# Patient Record
Sex: FEMALE | Race: AMERICAN INDIAN OR ALASKA NATIVE
[De-identification: names, ages, dates, MRNs, and addresses within clinical notes are randomized per-mention and may not be internally consistent; named-entity substitution may affect disease eponyms.]

---

## 2017-01-11 ENCOUNTER — HOSPITAL ENCOUNTER (OUTPATIENT)
Dept: HOSPITAL 5 - CT | Age: 32
Discharge: HOME | End: 2017-01-11
Attending: INTERNAL MEDICINE
Payer: MEDICAID
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
## 2017-01-11 DIAGNOSIS — D18.09: ICD-10-CM

## 2017-01-11 DIAGNOSIS — C50.512: Primary | ICD-10-CM
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
## 2017-01-11 DIAGNOSIS — K80.20: ICD-10-CM

## 2017-01-11 DIAGNOSIS — S30.1XXA: ICD-10-CM

## 2017-01-11 LAB — BUN SERPL-MCNC: 9 MG/DL (ref 7–17)

## 2017-01-11 PROCEDURE — 82565 ASSAY OF CREATININE: CPT

## 2017-01-11 PROCEDURE — 74177 CT ABD & PELVIS W/CONTRAST: CPT

## 2017-01-11 PROCEDURE — 71260 CT THORAX DX C+: CPT

## 2017-01-11 PROCEDURE — 84520 ASSAY OF UREA NITROGEN: CPT

## 2017-01-11 PROCEDURE — 36415 COLL VENOUS BLD VENIPUNCTURE: CPT

## 2017-01-11 NOTE — CAT SCAN REPORT
CT ABDOMEN AND PELVIS WITH CONTRAST:



HISTORY: Left breast cancer, new abdominal pain and abdominal masses.



TECHNIQUE: 

Helical CT following IV contrast. Sagittal and coronal reformatted 

images.



COMPARISON: PET/CT dated 06/01/15.



FINDINGS:

A 1.1 cm well-defined enhancing lesion is identified in the posterior 

segment of the right hepatic lobe. This lesion is isodense on the 

delayed images and consistent with a cavernous hemangioma. No 

suspicious liver mass. The liver is normal size and contour. The portal 

system is patent.



There are multiple calcified gallstones in the gallbladder. No biliary 

dilatation or inflammatory change. Normal pancreas, spleen, kidneys and 

adrenal glands. The aorta is normal caliber.



The bowel loops are normal caliber and wall thickness. Normal appendix.



The uterus and adnexa are within normal limits. Normal bladder. No 

abdominal, pelvic or retroperitoneal adenopathy.



The bony structures are intact. No suspicious bony lesion. There have 

been interval surgical changes in the lower abdominal wall, correlate 

with history. A fluid collection in the right lateral abdominal wall 

measures 5.8 x 3.4 x 5.1 cm and probably represents a resolving 

hematoma or seroma.



IMPRESSION:

No evidence for metastatic disease to the abdomen or pelvis. 



1.1 cm cavernous hemangioma in the right hepatic lobe with type I 

enhancement pattern.



Cholelithiasis.



Probable right lateral abdominal wall seroma, see above.

## 2017-01-11 NOTE — CAT SCAN REPORT
CT CHEST WITH CONTRAST:



HISTORY: Left breast cancer.



TECHNIQUE: 

Helical CT following IV contrast. Sagittal and coronal reformatted 

images.



FINDINGS: 

Comparison is made to the PET/CT dated 06/11/15.



Surgical changes are noted in both breasts, correlate with history. 

There is no obvious recurrent breast mass. Left axillary lymph node 

dissection changes are also identified. No suspicious thoracic 

adenopathy.



The thyroid gland, tracheobronchial tree, esophagus, heart, mediastinal 

vessels and pericardium are unremarkable.



The lungs are clear. No parenchymal disease, nodule or effusion. No 

pneumothorax.



The bony structures are intact. No abnormal bony destruction or 

production.



IMPRESSION:

Surgical changes, as described. No evidence for disease recurrence or 

metastasis in the chest.

## 2018-04-27 ENCOUNTER — HOSPITAL ENCOUNTER (OUTPATIENT)
Dept: HOSPITAL 5 - OR | Age: 33
Discharge: HOME | End: 2018-04-27
Attending: PLASTIC SURGERY
Payer: MEDICAID

## 2018-04-27 VITALS — DIASTOLIC BLOOD PRESSURE: 77 MMHG | SYSTOLIC BLOOD PRESSURE: 129 MMHG

## 2018-04-27 DIAGNOSIS — N65.0: Primary | ICD-10-CM

## 2018-04-27 DIAGNOSIS — Z85.3: ICD-10-CM

## 2018-04-27 LAB
HCT VFR BLD CALC: 39.7 % (ref 30.3–42.9)
HGB BLD-MCNC: 13.3 GM/DL (ref 10.1–14.3)

## 2018-04-27 PROCEDURE — 85018 HEMOGLOBIN: CPT

## 2018-04-27 PROCEDURE — 20926: CPT

## 2018-04-27 PROCEDURE — 19366: CPT

## 2018-04-27 PROCEDURE — 85014 HEMATOCRIT: CPT

## 2018-04-27 PROCEDURE — 36415 COLL VENOUS BLD VENIPUNCTURE: CPT

## 2018-04-27 NOTE — ANESTHESIA CONSULTATION
Anesthesia Consult and Med Hx


Date of service: 04/27/18





- Airway


Anesthetic Teeth Evaluation: Good


ROM Head & Neck: Adequate


Mental/Hyoid Distance: Adequate


Mallampati Class: Class II


Intubation Access Assessment: Good





- Pulmonary Exam


CTA: Yes





- Cardiac Exam


Cardiac Exam: RRR





- Pre-Operative Health Status


ASA Pre-Surgery Classification: ASA2


Proposed Anesthetic Plan: General





- Pulmonary


Hx Smoking: No


Hx Asthma: No


COPD: No


Hx Pneumonia: No


Hx Sleep Apnea: No





- Cardiovascular System


Hx Hypertension: No





- Central Nervous System


Hx Seizures: No


Hx Psychiatric Problems: No





- Endocrine


Hx End Stage Renal Disease: No


Hx Thyroid Disease: No





- Other Systems


Hx Alcohol Use: No


Hx Substance Use: No


Hx Cancer: Yes

## 2018-04-27 NOTE — SHORT STAY SUMMARY
Short Stay Documentation


Date of service: 04/27/18


Narrative H&P: 





History of breast cancer and bilateral TRAM flap reconstruction.  Here for 

revision of breast reconstructions. 





- History


H&P: obtained from office


Past Medical History: No medical history (tyrone)


Past Surgical History: Other (bilateral reconstruction)


Social history: no significant social history





- Allergies and Medications


Current Medications: 


 Allergies





No Known Allergies Allergy (Verified 04/26/18 09:25)


 





 Home Medications











 Medication  Instructions  Recorded  Confirmed  Last Taken  Type


 


Letrozole (Nf) [Femara (Nf)] 2.5 mg PO QDAY 04/26/18 04/27/18 04/26/18 History








Active Medications





Hydromorphone HCl (Dilaudid)  0.5 mg IV Q10MIN PRN


   PRN Reason: Pain , Severe (7-10)


   Stop: 04/27/18 23:59


Hydromorphone HCl (Dilaudid)  0.25 mg IV Q10MIN PRN


   PRN Reason: Pain, Moderate (4-6)


   Stop: 04/27/18 23:59


Cefazolin Sodium 1 gm/ Sodium (Chloride)  20 mls @ 20 mls/10 min IV PREOP SHAQ; 

Protocol


   Stop: 04/27/18 23:59


Lactated Ringer's (Lactated Ringers)  1,000 mls @ 100 mls/hr IV AS DIRECT SHAQ


   Last Admin: 04/27/18 12:34 Dose:  100 mls/hr


Ketorolac Tromethamine (Toradol)  30 mg IV ONCE PRN


   PRN Reason: Pain, Moderate (4-6)


Meperidine HCl (Demerol)  25 mg IV ONCE PRN


   PRN Reason: Shivering


Midazolam HCl (Versed)  2 mg IV PREOP NR


   Stop: 04/27/18 23:59


   Last Admin: 04/27/18 12:37 Dose:  2 mg


Naloxone HCl (Narcan 0.4 Mg/1 Ml)  0.1 mg IV Q2MIN PRN


   PRN Reason: Res Rate </= 8 or 02 SAT < 92%


Ondansetron HCl (Zofran)  4 mg IV PREOP NR


   Stop: 04/27/18 23:59


   Last Admin: 04/27/18 12:37 Dose:  4 mg


Ondansetron HCl (Zofran)  4 mg IV ONCE PRN


   PRN Reason: Nausea And Vomiting











- Physical exam


General appearance: no acute distress


Breasts: other (s/p fat transfer to bilateral upper poles)





- Brief post op/procedure progress note


Date of procedure: 04/27/18


Procedure: 





bilateral breast reconstruction/revision





- Hospital course


Hospital course: 





discharged from PACU





- Disposition


Condition at discharge: Good


Disposition: DC-01 TO HOME OR SELFCARE





Short Stay Discharge Plan


Activity: no restrictions


Diet: regular


Wound: keep clean and dry


Follow up with: 


PRIMARY CARE,MD [Primary Care Provider] - 7 Days


ESSENCE NANCE MD [Staff Physician] - 14 Days

## 2018-04-27 NOTE — POST OPERATIVE NOTE
Pre-op diagnosis: history of breast cancer


Post-op diagnosis: same


Findings: 





bilateral upper pole breast defects, right dog ear


Procedure: 





revision of right breast reconstruction, reconstruction of bilateral breasts 

with fat transfer


Anesthesia: KARAN


Surgeon: ESSENCE NANCE


Estimated blood loss: minimal


Pathology: none


Condition: stable


Disposition: PACU

## 2018-06-01 ENCOUNTER — HOSPITAL ENCOUNTER (OUTPATIENT)
Dept: HOSPITAL 5 - SPVWC | Age: 33
Discharge: HOME | End: 2018-06-01
Attending: INTERNAL MEDICINE
Payer: MEDICAID

## 2018-06-01 DIAGNOSIS — Z98.890: ICD-10-CM

## 2018-06-01 DIAGNOSIS — Z90.11: ICD-10-CM

## 2018-06-01 DIAGNOSIS — C50.512: Primary | ICD-10-CM

## 2018-06-01 PROCEDURE — A9577 INJ MULTIHANCE: HCPCS

## 2018-06-01 PROCEDURE — C8908 MRI W/O FOL W/CONT, BREAST,: HCPCS

## 2018-06-01 PROCEDURE — 77059: CPT

## 2018-06-05 NOTE — MAGNETIC RESONANCE REPORT
BILATERAL BREAST MRI WITHOUT AND WITH CONTRAST: 06/01/18 13:04:00



CLINICAL: 32-year-old with history of breast cancer who underwent a 

modified left radical mastectomy and right prophylactic total 

mastectomy for stage I left breast cancer in November 2015.  She has 

had bilateral breast reconstruction and reports a right chest wall lump 

that she has noticed since November 2017.



COMPARISON:None.



TECHNIQUE: Axial 1.0-mm T1 without,  axial high resolution 2.0-mm T2 

and axial 1.0-mm dynamic Vibrant high-resolution postcontrast T1 fat 

saturation sequences on a 1.5 Kerri magnet.  The examination was 

performed with an 8 channel dedicated [employer] breast coil. Post 

processing with CAD and subtraction was performed on an [employer] 

workstation.  18.0 cc of Multihance was injected without incident for 

the contrast portion of the exam. Consent was obtained prior to the 

administration of the contrast.  



FINDINGS:



Right: Minimal enhancement of the reconstructed right breast.  No mass 

or suspicious enhancement.  Asymmetric skin thickening of the 

inferolateral breast with a maximum thickness of 12 mm.  No enhancement 

of the thickened skin.  No suspicious lymph nodes.



Left: Minimal enhancement of the reconstructed left breast.  No mass or 

suspicious enhancement.  No suspicious lymph nodes.



IMPRESSION: Benign skin thickening of the reconstructed right breast 

and otherwise negative study.  No suspicion of recurrent tumor.



BI-RADS  2 - - Benign

## 2022-04-01 NOTE — OPERATIVE REPORT
PREOPERATIVE DIAGNOSIS:  Personal history of breast cancer.



POSTOPERATIVE DIAGNOSIS:  Personal history of breast cancer.



PROCEDURE:

1. Bilateral breast reconstruction using other technique, fat transfer, CPT CODE

92806-85.

2. Harvesting of fat from a donor site abdomen and lateral bra rolls for fat

transfer, CPT CODE 09878.

3. Revision of reconstructed right breast with excision of right lateral dog

ear, CPT CODE 09598-B.



SURGEON:  Esau Weems M.D.



ASSISTANT:  None.



ANESTHESIA:  General.



OPERATIVE INDICATIONS:  This is a 32-year-old female with a history of previous

breast cancer, underwent bilateral TRAM flap reconstruction.  This was done with

another surgeon.  She presented to me for revision as she was unhappy with her

results.  I performed multiple operations on her to get her back to appropriate

size and symmetry.  She is doing very well and happy with the reconstruction,

but still has some deficiencies in the upper pole and is very flat on top of the

TRAM flap.  She also has a prominent dog ear on the right side underneath her

arm that is sticking out and bothering her as well.  Risks and benefits of

surgery were discussed with the patient.  She agreed.



DESCRIPTION OF PROCEDURE:  After informed consent was obtained, the patient was

brought to the operating room and placed supine on operating table. 

Preoperative antibiotics and general anesthesia were administered.  The patient

was prepped and draped in usual sterile fashion.  Timeout was called verifying

the patient, operation being performed, side and site of the operation.  We

began by injecting tumescent fluid, which was lactated Ringer's with an amp of 
epi and

gave approximately 500 mL of fluid, which was injected into the anterior abdomen

and the flanks and then the axillary bra rolls at both sides.  We allowed 10-15

minutes to pass for epinephrine to take effect.  We then used the lipo grafter

device to both harvest and transfer the fat.  We used the harvesting cannula. 

After we made a small stab incision in the umbilicus to access the abdomen and a

liposuction of the anterior abdomen and the flanks.  I then made small stab

incisions in her previous scars on the lateral breast and accessed the lateral

axillary breast rolls and liposuctioned those as well.  The fat was harvested

into 250 mL bags.  We then allowed the fat to sit for 10-15 minutes, to let the

fluid drain off which was then released.  While I was doing that and waiting for

the fat, I turned my attention to the dog ear on the right.  She had a prominent

dog ear in the lateral breast roll.  An elliptical incision was marked and then

a full thickness excision was done of skin and some subcutaneous tissue of that

dog ear.  Hemostasis was achieved.  I then closed it with 3-0 Monocryl deep

dermals and a 3-0 Monocryl running subcuticular.  We then sat the patient up and

once the fat was ready for reinjection.  We used the lipo grafter device again

with the injection cannula.  I made a small stab incision in both breasts using

18 gauge needle for access and we then went ahead and placed the fat into the

upper pole and the medial border to improve the contours there.  We gave a total

of 170 mL of fat into the right breast and 150 mL into the left breast.  This

did give us good symmetry and corrected the deformity.  All the access sites

were closed with a 5-0 fast gut suture.  Some dressings were applied.  The

patient tolerated the procedure well.  At the end of procedure, I injected 0.25%

Marcaine into all the liposuction areas for postoperative pain control.



COMPLICATIONS:  None.



ESTIMATED BLOOD LOSS:  Minimal.



SPECIMENS:  None.



FINDINGS:  Bilateral upper pole breast defects.





DD: 04/27/2018 15:40

DT: 04/28/2018 00:06

JOB# [id number]  [id number]

THANIA/CJ JACKSON
apt +elevator, +tub Owns:  rollator, cane, grab bars, elevated toilet seat, commode and tub transfer bench./spouse